# Patient Record
Sex: FEMALE
[De-identification: names, ages, dates, MRNs, and addresses within clinical notes are randomized per-mention and may not be internally consistent; named-entity substitution may affect disease eponyms.]

---

## 2021-11-07 ENCOUNTER — HOSPITAL ENCOUNTER (EMERGENCY)
Age: 31
Discharge: HOME | End: 2021-11-07
Payer: OTHER GOVERNMENT

## 2021-11-07 VITALS
SYSTOLIC BLOOD PRESSURE: 133 MMHG | RESPIRATION RATE: 18 BRPM | OXYGEN SATURATION: 99 % | HEART RATE: 94 BPM | DIASTOLIC BLOOD PRESSURE: 67 MMHG | TEMPERATURE: 98.6 F

## 2021-11-07 VITALS
RESPIRATION RATE: 15 BRPM | DIASTOLIC BLOOD PRESSURE: 56 MMHG | SYSTOLIC BLOOD PRESSURE: 113 MMHG | HEART RATE: 90 BPM | OXYGEN SATURATION: 99 %

## 2021-11-07 VITALS — BODY MASS INDEX: 38.4 KG/M2

## 2021-11-07 DIAGNOSIS — S93.402A: Primary | ICD-10-CM
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
## 2021-11-07 DIAGNOSIS — S96.112A: ICD-10-CM

## 2021-11-07 PROCEDURE — 99283 EMERGENCY DEPT VISIT LOW MDM: CPT

## 2021-11-07 PROCEDURE — 73610 X-RAY EXAM OF ANKLE: CPT

## 2021-12-13 ENCOUNTER — HOSPITAL ENCOUNTER (OUTPATIENT)
Dept: HOSPITAL 76 - DI | Age: 31
Discharge: HOME | End: 2021-12-13
Attending: STUDENT IN AN ORGANIZED HEALTH CARE EDUCATION/TRAINING PROGRAM
Payer: COMMERCIAL

## 2021-12-13 DIAGNOSIS — M25.472: ICD-10-CM

## 2021-12-13 DIAGNOSIS — S93.422A: Primary | ICD-10-CM

## 2021-12-13 NOTE — MRI REPORT
PROCEDURE:  Ankle LT W/O

 

INDICATIONS:  ANKLE INJURY

 

TECHNIQUE:  

Noncontrast sagittal T1 spin echo and T2 fast spin echo with fat saturation, axial proton density fas
t spin echo and T2 fast spin echo with fat saturation, coronal T1 spin echo and T2 fast spin echo wit
h fat saturation through the ankle/hindfoot.  

 

COMPARISON:  None.

 

 

Findings:

 

Bones:  T2 hyperintense signal within the medial central talus, compatible with contusion. No discret
e fracture line is appreciated.

 

Muscles: No evidence of muscular atrophy or edema. 

 

Anterior tibiofibular ligament: Intact.

Posterior tibiofibular ligament: Intact.

Calcaneofibular ligament: Intact.

 

Talar dome: No significant abnormality.

 

Anterior talofibular ligament: Intact.

Posterior talofibular ligament: Intact.

 

Deltoid ligament: Partially disrupted

 

Peroneal tendons: No evidence of tear or tenosynovitis.

 

Tibialis posterior: No evidence of tear or tenosynovitis.

Flexor digitorum: No evidence of tear or tenosynovitis.

Flexor hallucis longus: No evidence of tear or tenosynovitis.

 

Sinus Tarsi: No mass or fibrosis.

 

Achilles tendon: Intact.

 

Joint effusion: Small tibiotalar joint effusion.

 

Plantar fascia: No evidence of tear or inflammation.

 

IMPRESSION:  

1. Edema within the medial/central portion of the talus, compatible with contusion.

2. Partial disruption of the deltoid ligament.

3. Small tibiotalar joint effusion.

 

 

 

Reviewed by: Roland Urena MD on 12/13/2021 8:23 PM PST

Approved by: Roland Urena MD on 12/13/2021 8:23 PM PST

 

 

Station ID:  [id number]

## 2022-11-01 ENCOUNTER — HOSPITAL ENCOUNTER (EMERGENCY)
Age: 32
Discharge: LEFT BEFORE DISCHARGE | End: 2022-11-01
Payer: OTHER GOVERNMENT

## 2022-11-01 VITALS
RESPIRATION RATE: 18 BRPM | SYSTOLIC BLOOD PRESSURE: 137 MMHG | OXYGEN SATURATION: 99 % | HEART RATE: 66 BPM | DIASTOLIC BLOOD PRESSURE: 65 MMHG | TEMPERATURE: 97.88 F
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
## 2022-11-01 DIAGNOSIS — S69.91XA: Primary | ICD-10-CM

## 2022-11-01 PROCEDURE — 73130 X-RAY EXAM OF HAND: CPT

## 2022-11-01 PROCEDURE — 99281 EMR DPT VST MAYX REQ PHY/QHP: CPT
